# Patient Record
Sex: MALE | Race: WHITE | NOT HISPANIC OR LATINO | URBAN - METROPOLITAN AREA
[De-identification: names, ages, dates, MRNs, and addresses within clinical notes are randomized per-mention and may not be internally consistent; named-entity substitution may affect disease eponyms.]

---

## 2023-12-10 ENCOUNTER — OFFICE VISIT (OUTPATIENT)
Dept: URGENT CARE | Facility: CLINIC | Age: 48
End: 2023-12-10
Payer: MEDICAID

## 2023-12-10 VITALS
RESPIRATION RATE: 18 BRPM | HEART RATE: 68 BPM | HEIGHT: 66 IN | DIASTOLIC BLOOD PRESSURE: 84 MMHG | BODY MASS INDEX: 38.57 KG/M2 | TEMPERATURE: 97.8 F | OXYGEN SATURATION: 96 % | WEIGHT: 240 LBS | SYSTOLIC BLOOD PRESSURE: 136 MMHG

## 2023-12-10 DIAGNOSIS — J34.89 SINUS PRESSURE: ICD-10-CM

## 2023-12-10 DIAGNOSIS — J06.9 ACUTE URI: Primary | ICD-10-CM

## 2023-12-10 PROCEDURE — 99203 OFFICE O/P NEW LOW 30 MIN: CPT | Performed by: PHYSICIAN ASSISTANT

## 2023-12-10 RX ORDER — PREDNISONE 10 MG/1
30 TABLET ORAL DAILY
Qty: 15 TABLET | Refills: 0 | Status: SHIPPED | OUTPATIENT
Start: 2023-12-10 | End: 2023-12-15

## 2023-12-10 RX ORDER — PREDNISONE 10 MG/1
30 TABLET ORAL DAILY
Qty: 15 TABLET | Refills: 0 | Status: SHIPPED | OUTPATIENT
Start: 2023-12-10 | End: 2023-12-10 | Stop reason: SDUPTHER

## 2023-12-10 ASSESSMENT — ENCOUNTER SYMPTOMS
SHORTNESS OF BREATH: 0
PSYCHIATRIC NEGATIVE: 1
DYSURIA: 0
ABDOMINAL PAIN: 0
ACTIVITY CHANGE: 0
EYE DISCHARGE: 0
EYE REDNESS: 0
DIARRHEA: 0
COLOR CHANGE: 0
BACK PAIN: 0
FLANK PAIN: 0
APPETITE CHANGE: 0
ARTHRALGIAS: 0
FATIGUE: 0
HEMATOLOGIC/LYMPHATIC NEGATIVE: 1
COUGH: 1
ALLERGIC/IMMUNOLOGIC NEGATIVE: 1
WOUND: 0
ENDOCRINE NEGATIVE: 1
SINUS PRESSURE: 1
NAUSEA: 0
NEUROLOGICAL NEGATIVE: 1
RHINORRHEA: 1
VOMITING: 0
BLOOD IN STOOL: 0
EYE PAIN: 0
WHEEZING: 0
SORE THROAT: 1
FEVER: 0

## 2023-12-10 ASSESSMENT — PAIN SCALES - GENERAL: PAINLEVEL: 5

## 2023-12-10 NOTE — PROGRESS NOTES
"Subjective   Patient ID: Carlo Shultz is a 48 y.o. male who presents for Sinus Problem (Runny nose, ST on R side x2 days).  Patient without any fevers or chills any nausea vomiting diarrhea or abdominal pain no chest pain or shortness of breath.  He endorses sinus pressure but denies any sinus pain.  His wife recently seen by myself for sinus infection and patient is concerned that he may have the same    Past Medical History:   Diagnosis Date    Personal history of other endocrine, nutritional and metabolic disease 04/19/2019    History of obesity       Review of Systems   Constitutional:  Negative for activity change, appetite change, fatigue and fever.   HENT:  Positive for rhinorrhea, sinus pressure and sore throat. Negative for congestion.    Eyes:  Negative for pain, discharge and redness.   Respiratory:  Positive for cough. Negative for shortness of breath and wheezing.    Cardiovascular:  Negative for chest pain and leg swelling.   Gastrointestinal:  Negative for abdominal pain, blood in stool, diarrhea, nausea and vomiting.   Endocrine: Negative.    Genitourinary:  Negative for dysuria and flank pain.   Musculoskeletal:  Negative for arthralgias, back pain and gait problem.   Skin:  Negative for color change, rash and wound.   Allergic/Immunologic: Negative.    Neurological: Negative.    Hematological: Negative.    Psychiatric/Behavioral: Negative.         Objective   /84   Pulse 68   Temp 36.6 °C (97.8 °F)   Resp 18   Ht 1.676 m (5' 6\")   Wt 109 kg (240 lb)   SpO2 96%   BMI 38.74 kg/m²   Physical Exam  Vitals reviewed.   Constitutional:       General: He is not in acute distress.     Appearance: Normal appearance. He is not toxic-appearing.   HENT:      Head: Normocephalic.      Comments: No tenderness to palpation over the frontal or maxillary sinuses     Right Ear: Tympanic membrane and ear canal normal. No tenderness. No mastoid tenderness.      Left Ear: Tympanic membrane and ear canal " normal. No tenderness. No mastoid tenderness.      Nose: Congestion and rhinorrhea present.      Mouth/Throat:      Mouth: Mucous membranes are moist.      Pharynx: Oropharynx is clear. Posterior oropharyngeal erythema present.   Eyes:      Conjunctiva/sclera: Conjunctivae normal.      Pupils: Pupils are equal, round, and reactive to light.   Cardiovascular:      Rate and Rhythm: Normal rate and regular rhythm.      Heart sounds: No murmur heard.  Pulmonary:      Effort: No respiratory distress.      Breath sounds: No stridor. No wheezing, rhonchi or rales.   Chest:      Chest wall: No tenderness.   Abdominal:      Tenderness: There is no abdominal tenderness. There is no guarding.   Musculoskeletal:         General: Normal range of motion.   Skin:     General: Skin is warm and dry.      Capillary Refill: Capillary refill takes less than 2 seconds.      Findings: No erythema.   Neurological:      General: No focal deficit present.      Mental Status: He is alert.         Assessment/Plan   Problem List Items Addressed This Visit       Acute URI - Primary    Sinus pressure    Relevant Medications    predniSONE (Deltasone) 10 mg tablet   -Discussed with patient the general course of sinus infections, recommending against use of antibiotics unless symptoms are prolonged and worsening after day 7  Recommending Coricidin over-the-counter as well as Flonase  I have sent a short burst of prednisone to help with the patient's sinus pressure and symptoms.

## 2023-12-10 NOTE — PATIENT INSTRUCTIONS
Assessment/Plan   Problem List Items Addressed This Visit       Acute URI - Primary    Sinus pressure    Relevant Medications    predniSONE (Deltasone) 10 mg tablet   -Discussed with patient the general course of sinus infections, recommending against use of antibiotics unless symptoms are prolonged and worsening after day 7  Recommending Coricidin over-the-counter as well as Flonase  I have sent a short burst of prednisone to help with the patient's sinus pressure and symptoms.

## 2024-11-11 ENCOUNTER — OFFICE VISIT (OUTPATIENT)
Dept: URGENT CARE | Age: 49
End: 2024-11-11
Payer: MEDICAID

## 2024-11-11 VITALS
TEMPERATURE: 98.8 F | DIASTOLIC BLOOD PRESSURE: 87 MMHG | SYSTOLIC BLOOD PRESSURE: 132 MMHG | RESPIRATION RATE: 16 BRPM | OXYGEN SATURATION: 96 % | HEART RATE: 89 BPM | HEIGHT: 66 IN | WEIGHT: 240 LBS | BODY MASS INDEX: 38.57 KG/M2

## 2024-11-11 DIAGNOSIS — R05.3 PERSISTENT COUGH: Primary | ICD-10-CM

## 2024-11-11 DIAGNOSIS — J02.9 SORE THROAT: ICD-10-CM

## 2024-11-11 LAB — POC RAPID STREP: NEGATIVE

## 2024-11-11 PROCEDURE — 3008F BODY MASS INDEX DOCD: CPT | Performed by: PHYSICIAN ASSISTANT

## 2024-11-11 PROCEDURE — 87880 STREP A ASSAY W/OPTIC: CPT | Performed by: PHYSICIAN ASSISTANT

## 2024-11-11 PROCEDURE — 99203 OFFICE O/P NEW LOW 30 MIN: CPT | Performed by: PHYSICIAN ASSISTANT

## 2024-11-11 RX ORDER — METOPROLOL SUCCINATE 25 MG/1
25 TABLET, EXTENDED RELEASE ORAL DAILY
COMMUNITY

## 2024-11-11 RX ORDER — AMOXICILLIN AND CLAVULANATE POTASSIUM 875; 125 MG/1; MG/1
875 TABLET, FILM COATED ORAL 2 TIMES DAILY
Qty: 14 TABLET | Refills: 0 | Status: SHIPPED | OUTPATIENT
Start: 2024-11-11 | End: 2024-11-18

## 2024-11-11 RX ORDER — LISINOPRIL 2.5 MG/1
2.5 TABLET ORAL DAILY
COMMUNITY

## 2024-11-11 RX ORDER — AMLODIPINE BESYLATE 2.5 MG/1
2.5 TABLET ORAL DAILY
COMMUNITY

## 2024-11-11 RX ORDER — BENZONATATE 200 MG/1
200 CAPSULE ORAL 3 TIMES DAILY PRN
Qty: 21 CAPSULE | Refills: 0 | Status: SHIPPED | OUTPATIENT
Start: 2024-11-11 | End: 2024-11-18

## 2024-11-11 NOTE — PROGRESS NOTES
"Subjective   Patient ID: Carlo Shultz is a 49 y.o. male. They present today with a chief complaint of Sore Throat.    History of Present Illness  HPI  48 y/o pt. Presents to clinic with complaints of productive cough with associated sore throat, B/L ear pressure/fullness, chest congestion, nasal congestion for the past 2 weeks which was improving until about 2 days ago when sore throat and cough significantly worsened. Reports has tried robitussin, vit c, zinc without relief. Denies shortness of breath, drooling, chest pain, dizziness, fevers, chills, body aches, nausea, vomiting, abdominal pain, diarrhea.    Past Medical History  Allergies as of 11/11/2024    (No Known Allergies)       (Not in a hospital admission)       Past Medical History:   Diagnosis Date    Personal history of other endocrine, nutritional and metabolic disease 04/19/2019    History of obesity       Past Surgical History:   Procedure Laterality Date    OTHER SURGICAL HISTORY  04/19/2019    No history of surgery        reports that he has never smoked. He has never used smokeless tobacco.    Review of Systems  Review of Systems     ROS negative with the exception as noted on HPI                           Objective    Vitals:    11/11/24 1202   BP: 132/87   Pulse: 89   Resp: 16   Temp: 37.1 °C (98.8 °F)   SpO2: 96%   Weight: 109 kg (240 lb)   Height: 1.676 m (5' 6\")     No LMP for male patient.    Physical Exam  Constitutional:       Appearance: Normal appearance.   HENT:      Head: Normocephalic and atraumatic.      Right Ear: Ear canal and external ear normal. A middle ear effusion is present.      Left Ear: Ear canal and external ear normal. A middle ear effusion is present.      Nose: Mucosal edema present. No rhinorrhea.      Right Sinus: No maxillary sinus tenderness or frontal sinus tenderness.      Left Sinus: No maxillary sinus tenderness or frontal sinus tenderness.      Mouth/Throat:      Lips: Pink.      Mouth: Mucous membranes are " moist. No oral lesions.      Dentition: Normal dentition. No gingival swelling.      Tongue: No lesions. Tongue does not deviate from midline.      Palate: No mass and lesions.      Pharynx: Posterior oropharyngeal erythema present. No pharyngeal swelling, uvula swelling or postnasal drip.   Cardiovascular:      Rate and Rhythm: Normal rate and regular rhythm.      Heart sounds: No murmur heard.  Pulmonary:      Effort: Pulmonary effort is normal. No respiratory distress.      Breath sounds: Normal breath sounds. No stridor. No wheezing, rhonchi or rales.   Neurological:      Mental Status: He is alert.         Procedures    Point of Care Test & Imaging Results from this visit  Results for orders placed or performed in visit on 11/11/24   POCT rapid strep A manually resulted   Result Value Ref Range    POC Rapid Strep Negative Negative      No results found.    Diagnostic study results (if any) were reviewed by [facility].    Assessment/Plan   Allergies, medications, history, and pertinent labs/EKGs/Imaging reviewed by Ellen Genao PA-C.   productive cough with associated sore throat, B/L ear pressure/fullness, chest congestion, nasal congestion for the past 2 weeks which was improving until about 2 days ago when sore throat and cough significantly worsened.   Augmentin started.  Tessalon Perles started. Advised to drink plenty of fluids, run a cool-mist humidifier in room at night, gargle salt water for sore throat, and get plenty of rest. Pt. is advised to take 10 deep breaths and hours and continue to walk around every couple of hours. Patient should avoid over-exertion and reduce exposure to irritants such as smoke, cold, dry air, and dust. Patient may take acetaminophen or ibuprofen as directed to reduce fever and body aches. Antihistamine and decongestant usage was discussed and recommendations made. Risk, benefits, and potential side effects of medication(s) discussed with pt. Discussed  disease/illness presentation, treatment options, progression, complications, and outcomes with patient. Pt. Has expressed understanding and is an agreement of plan of care.    Medical Decision Making      Orders and Diagnoses  Diagnoses and all orders for this visit:  Sore throat  -     POCT rapid strep A manually resulted      Medical Admin Record      Patient disposition: Home    Electronically signed by [facility]  12:15 PM